# Patient Record
(demographics unavailable — no encounter records)

---

## 2024-10-21 NOTE — PHYSICAL EXAM
[___/1] : [unfilled]/1   [___/3] : [unfilled]/3 [___/5] : [unfilled]/5 [___/4] : [unfilled]/4 [___/2] : [unfilled]/2 [___/8] : [unfilled]/8 [Mild Neurocognative Disorder] : Mild Neurocognative Disorder [TextBox_2] : Yes [TextBox_4] : College [SlumsTotal] : 26 [TextEntry] : Constitutional: Patient is well nourished, well appearing and in no distress Pulmonary: No respiratory distress Neuro: Speech normal, alert and oriented Psychiatric: Normal mood, normal insight/judgement, normal affect  SLUMS result is 26. Given that English is not the patient's native language I think that she has no significant cognitive impairment.

## 2024-10-21 NOTE — ASSESSMENT
[FreeTextEntry1] : . #Persistent UTI: Will prescribe cephalexin 500 mg twice daily for 1 week  #Elevated ASCVD risk: Will prescribe atorvastatin 10 mg daily and have patient come back in 1 month for repeat blood work  #Request for cognitive assessment: Probably normal.  Will complete patient's forms

## 2024-10-21 NOTE — HISTORY OF PRESENT ILLNESS
[FreeTextEntry1] : Patient is here for follow-up of urinary complaints and review of laboratory results.  In addition she has medical forms to be completed [de-identified] : Patient states that she continues to have some urinary symptoms.  The symptoms improved after the antibiotics.  She saw the urologist.  Repeat urine and culture was obtained which continued to show significant bacteriuria.  At the time she was asymptomatic so no treatment was given.  She states she currently has burning with urination and at times it is severe. Review of patient's labs shows that her 10-year ASCVD risk is 14%.  I have advised that she consider taking a statin and she agreed. Patient has asked me to fill out forms indicating she is competent to make decisions for herself.  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